# Patient Record
Sex: FEMALE | Race: WHITE | NOT HISPANIC OR LATINO | Employment: UNEMPLOYED | URBAN - METROPOLITAN AREA
[De-identification: names, ages, dates, MRNs, and addresses within clinical notes are randomized per-mention and may not be internally consistent; named-entity substitution may affect disease eponyms.]

---

## 2017-04-07 DIAGNOSIS — Z12.31 OTHER SCREENING MAMMOGRAM: ICD-10-CM

## 2019-09-14 ENCOUNTER — HOSPITAL ENCOUNTER (EMERGENCY)
Facility: HOSPITAL | Age: 59
Discharge: HOME | End: 2019-09-14
Attending: FAMILY MEDICINE

## 2019-09-14 VITALS
HEIGHT: 64 IN | DIASTOLIC BLOOD PRESSURE: 78 MMHG | RESPIRATION RATE: 18 BRPM | TEMPERATURE: 98 F | HEART RATE: 78 BPM | WEIGHT: 128 LBS | OXYGEN SATURATION: 100 % | SYSTOLIC BLOOD PRESSURE: 148 MMHG | BODY MASS INDEX: 21.85 KG/M2
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
## 2019-09-14 DIAGNOSIS — M54.50 LUMBAR PAIN: ICD-10-CM

## 2019-09-14 DIAGNOSIS — S09.90XA INJURY OF HEAD, INITIAL ENCOUNTER: ICD-10-CM

## 2019-09-14 DIAGNOSIS — S00.03XA CONTUSION OF SCALP, INITIAL ENCOUNTER: ICD-10-CM

## 2019-09-14 PROCEDURE — 99284 EMERGENCY DEPT VISIT MOD MDM: CPT | Mod: 25,ER

## 2019-09-14 PROCEDURE — 25000003 PHARM REV CODE 250: Mod: ER | Performed by: FAMILY MEDICINE

## 2019-09-14 RX ORDER — METHOCARBAMOL 500 MG/1
1000 TABLET, FILM COATED ORAL 3 TIMES DAILY
Qty: 30 TABLET | Refills: 0 | Status: SHIPPED | OUTPATIENT
Start: 2019-09-14 | End: 2019-09-19

## 2019-09-14 RX ORDER — KETOROLAC TROMETHAMINE 30 MG/ML
60 INJECTION, SOLUTION INTRAMUSCULAR; INTRAVENOUS
Status: DISCONTINUED | OUTPATIENT
Start: 2019-09-14 | End: 2019-09-14

## 2019-09-14 RX ORDER — IBUPROFEN 400 MG/1
800 TABLET ORAL
Status: COMPLETED | OUTPATIENT
Start: 2019-09-14 | End: 2019-09-14

## 2019-09-14 RX ORDER — HYDROCODONE BITARTRATE AND ACETAMINOPHEN 5; 325 MG/1; MG/1
1 TABLET ORAL
Status: DISCONTINUED | OUTPATIENT
Start: 2019-09-14 | End: 2019-09-14

## 2019-09-14 RX ORDER — IBUPROFEN 800 MG/1
800 TABLET ORAL EVERY 6 HOURS PRN
Qty: 20 TABLET | Refills: 0 | Status: SHIPPED | OUTPATIENT
Start: 2019-09-14

## 2019-09-14 RX ORDER — KETOROLAC TROMETHAMINE 30 MG/ML
30 INJECTION, SOLUTION INTRAMUSCULAR; INTRAVENOUS
Status: DISCONTINUED | OUTPATIENT
Start: 2019-09-14 | End: 2019-09-14

## 2019-09-14 RX ADMIN — IBUPROFEN 800 MG: 400 TABLET, FILM COATED ORAL at 10:09

## 2019-09-14 NOTE — ED NOTES
"Pt informed of medications ordered. Pt refused Toradol, pt states "it makes my heart race, I don't like the way it makes me feel." Pt also states "I want to hold off on the other medication so I can have a clear head when the doctor comes in to discuss my results." MD notified.   "

## 2019-09-16 NOTE — ED PROVIDER NOTES
Encounter Date: 2019       History     Chief Complaint   Patient presents with    Fall     Pt reports she fell down 3 steps. Pt states she fell backwards, hit her head on a kitchen [place], hit R elbow on a door frame, and hit back on the floor. Pt with lac to posterior head, (+)hematoma Pt reports blurred vision in L dino. Pt denies LOC. (+)abrasion and bruising noted to LISANDRA steve.      59-year-old female presents with chief complaint of a fall.  Patient reports he fell down 3 steps hitting the back of her head on the floor.  Patient reports the vision left eye appears to be blurry.  Patient denies any loss of consciousness but was concerned with the symptoms so decided presents emergency room for evaluation.  Patient does admit has a history of glaucoma.  Patient reports pain at the back of her head and upper neck.  Patient also reports pain to her lower back.  Patient reports simply slipped down the steps.         Review of patient's allergies indicates:   Allergen Reactions    Adhesive     Codeine     Milk containing products     Pcn [penicillins]     Peroxide sore mouth cleanser [hydrogen peroxide]     Phenergan [promethazine]      Past Medical History:   Diagnosis Date    Anxiety     Diverticula of colon     Glaucoma      Past Surgical History:   Procedure Laterality Date    ADENOIDECTOMY      APPENDECTOMY      BREAST SURGERY      tumors removed    CHOLECYSTECTOMY      COLONOSCOPY      2013    HYSTERECTOMY      KNEE ARTHROSCOPY Right     SMALL INTESTINE SURGERY      4 lymph nodes removed    SPINE SURGERY      neck  c3-5and lower back    TONSILLECTOMY       Family History   Problem Relation Age of Onset    Diabetes Mother     Cancer Father         stomach     Social History     Tobacco Use    Smoking status: Former Smoker     Packs/day: 1.00     Last attempt to quit: 2017     Years since quittin.0    Smokeless tobacco: Never Used   Substance Use Topics    Alcohol use: No     Drug use: No     Review of Systems   Constitutional: Negative for chills and fever.   Eyes: Positive for visual disturbance.   Musculoskeletal: Positive for back pain and neck pain.   Neurological: Positive for headaches.   All other systems reviewed and are negative.      Physical Exam     Initial Vitals [09/14/19 0845]   BP Pulse Resp Temp SpO2   (!) 154/81 75 18 97.8 °F (36.6 °C) 99 %      MAP       --         Physical Exam    Nursing note and vitals reviewed.  Constitutional: She appears well-developed and well-nourished.   HENT:   Head: Normocephalic and atraumatic.   Eyes: EOM are normal. Pupils are equal, round, and reactive to light.   Neck: Neck supple. Spinous process tenderness and muscular tenderness present. Decreased range of motion present. No tracheal deviation present. No neck rigidity.       Cardiovascular: Normal rate, regular rhythm and normal heart sounds.   Musculoskeletal:        Right shoulder: She exhibits tenderness. She exhibits no bony tenderness.        Lumbar back: She exhibits tenderness, bony tenderness and pain. She exhibits no spasm.         ED Course   Procedures  Labs Reviewed - No data to display       Imaging Results          X-Ray Lumbar Spine Ap And Lateral (Final result)  Result time 09/14/19 10:01:00    Final result by Porfirio Salvador MD (09/14/19 10:01:00)                 Impression:      Nonspecific T12 wedge deformity.  Otherwise degenerative disc disease/facet arthritis of the lumbar spine.      Electronically signed by: Porfirio Salvador MD  Date:    09/14/2019  Time:    10:01             Narrative:    EXAMINATION:  XR LUMBAR SPINE AP AND LATERAL    CLINICAL HISTORY:  Back pain.,T/L-spine trauma, minor-mod, low back pain;    TECHNIQUE:  Standard lumbar spine x-ray.    COMPARISON:  None    FINDINGS:  Three-view lumbar spine x-ray.  Right upper quadrant surgical clips.  Minor scoliosis.  Nonspecific wedge deformity of T12.  Age indeterminate.  Mild lumbar  spondylosis.  Moderate multilevel facet arthritis.    No significant incidental abnormality.                               CT Cervical Spine Without Contrast (Final result)  Result time 09/14/19 10:22:16    Final result by Porfirio Salvador MD (09/14/19 10:22:16)                 Impression:      No acute findings.  Cervical degenerative disc disease.  Postop changes C4-5 and C5-6 disc levels.    All CT scans at this facility use dose modulation, iterative reconstruction and/or weight based dosing when appropriate to reduce radiation dose to as low as reasonably achievable.      Electronically signed by: Porfirio Salvador MD  Date:    09/14/2019  Time:    10:22             Narrative:    EXAMINATION:  CT CERVICAL SPINE WITHOUT CONTRAST    CLINICAL HISTORY:  Neck injury with neck pain.  History of trauma.  C-spine trauma, NEXUS/CCR positive, +risk factor(s);    TECHNIQUE:  Axial CT of the cervical spine with coronal and sagittal reformates.    All CT scans at this facility are performed  using dose modulation techniques as appropriate to performed exam including the following:  automated exposure control; adjustment of mA and/or kV according to the patients size (this includes techniques or standardized protocols for targeted exams where dose is matched to indication/reason for exam: i.e. extremities or head);  iterative reconstruction technique.    COMPARISON:  None    FINDINGS:  Limited exam in part due to difficulty in positioning the patient.    There are postoperative changes consistent with cervical discectomy and fusion at the C4-5 and C5-6 disc spaces.    C2-3, C3-4 and C6-7 cervical degenerative disc disease are noted.    No evidence of acute cervical spine fracture.  Mild multilevel facet arthritis.    No additional findings.                               CT Head Without Contrast (Final result)  Result time 09/14/19 09:48:09    Final result by Joey Hayes MD (09/14/19 09:48:09)                  Impression:      Subcutaneous hematoma/contusion overlying the left parietal calvarium without evidence of underlying fracture.    No evidence of acute intracranial hemorrhage.    All CT scans at this facility use dose modulation, iterative reconstruction, and/or weight base dosing when appropriate to reduce radiation dose to as low as reasonably achievable.      Electronically signed by: Joey Hayes  Date:    09/14/2019  Time:    09:48             Narrative:    EXAMINATION:  CT HEAD WITHOUT CONTRAST    CLINICAL HISTORY:  Head trauma, intracranial venous inj suspected;Focal neuro deficit, new, fixed or worsening, <6 hours;    TECHNIQUE:  Contiguous axial images were obtained from the skull base through the vertex without intravenous contrast.    COMPARISON:  None    FINDINGS:  No intracranial hemorrhage. No mass effect or midline shift. Normal parenchymal attenuation. The ventricles and sulci are normal in size and configuration. The paranasal sinuses and mastoid air cells are clear.  Prominent subcutaneous hematoma overlying the left frontal calvarium without evidence of underlying fracture.  Remainder of the osseous structures appear intact.                                 Medical Decision Making:   Differential Diagnosis:   Intracranial bleed, skull fracture, cervical fracture, lumbar fracture  Clinical Tests:   Radiological Study: Ordered and Reviewed  ED Management:  In light of the mechanics of the patient's fall and H visual changes came concerned about intracranial injury so CT the head and cervical spine were obtained.  Elbow revealed no acute findings but did find lower back spinous process pain and tenderness which was also x-rayed.  Patient's symptoms of right eye blurry vision abated in no acute visual disturbances were noted. Patient requested non narcotic therapy with ibuprofen.  Patient was advised to follow up PCP in 2-3 days and return here worsened.                      Clinical Impression:        ICD-10-CM ICD-9-CM   1. Fall, initial encounter W19.XXXA E888.9   2. Contusion of scalp, initial encounter S00.03XA 920   3. Lumbar pain M54.5 724.2   4. Injury of head, initial encounter S09.90XA 959.01                                Dorian Tovar MD  09/16/19 0553       Dorian Tovar MD  09/24/19 0637

## 2023-09-02 ENCOUNTER — HOSPITAL ENCOUNTER (EMERGENCY)
Age: 63
End: 2023-09-02
Payer: SELF-PAY

## 2023-09-02 VITALS
DIASTOLIC BLOOD PRESSURE: 76 MMHG | SYSTOLIC BLOOD PRESSURE: 118 MMHG | RESPIRATION RATE: 19 BRPM | HEART RATE: 84 BPM | OXYGEN SATURATION: 97 % | TEMPERATURE: 98.06 F

## 2023-09-02 VITALS
SYSTOLIC BLOOD PRESSURE: 112 MMHG | DIASTOLIC BLOOD PRESSURE: 74 MMHG | RESPIRATION RATE: 18 BRPM | HEART RATE: 77 BPM | OXYGEN SATURATION: 95 %

## 2023-09-02 VITALS — RESPIRATION RATE: 18 BRPM

## 2023-09-02 VITALS — SYSTOLIC BLOOD PRESSURE: 124 MMHG | HEART RATE: 73 BPM | DIASTOLIC BLOOD PRESSURE: 66 MMHG | OXYGEN SATURATION: 100 %

## 2023-09-02 VITALS — OXYGEN SATURATION: 99 %

## 2023-09-02 VITALS — BODY MASS INDEX: 26.6 KG/M2

## 2023-09-02 DIAGNOSIS — S82.872A: ICD-10-CM
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
## 2023-09-02 DIAGNOSIS — S82.62XA: ICD-10-CM

## 2023-09-02 DIAGNOSIS — S82.252A: Primary | ICD-10-CM

## 2023-09-02 PROCEDURE — 73590 X-RAY EXAM OF LOWER LEG: CPT

## 2023-09-02 PROCEDURE — 73610 X-RAY EXAM OF ANKLE: CPT

## 2023-09-02 PROCEDURE — 73560 X-RAY EXAM OF KNEE 1 OR 2: CPT

## 2023-09-02 PROCEDURE — 73700 CT LOWER EXTREMITY W/O DYE: CPT

## 2023-09-02 PROCEDURE — 29505 APPLICATION LONG LEG SPLINT: CPT

## 2023-09-02 PROCEDURE — 73630 X-RAY EXAM OF FOOT: CPT

## 2023-09-02 PROCEDURE — 99284 EMERGENCY DEPT VISIT MOD MDM: CPT

## 2023-09-02 NOTE — XRR_ITS
PROCEDURE INFORMATION:  
Exam: XR Left Foot  
Exam date and time: 9/2/2023 5:41 PM  
Age: 63 years old  
Clinical indication: Injury or trauma; Fall  
 
TECHNIQUE:  
Imaging protocol: Radiologic exam of the left foot.  
Views: 3 or more views.  
 
COMPARISON:  
CR (LOW EXM, ) 9/2/2023 5:35 PM  
 
FINDINGS:  
Bones/joints: Markedly comminuted fracture of the distal tibia extending  
into the tibiotalar joint is again identified. Transverse fracture of the  
distal fibula and nondisplaced posterior malleolar fracture. Lisfranc joint  
alignment is intact. No additional fracture in the midfoot or forefoot.  
Soft tissues: There is abundant soft tissue edema surrounding the ankle  
fractures.  
 
ACCESSION #: [id number] XR/XR foot LT min 3V* [id number]  
IMPRESSION:   
1.   Markedly comminuted fracture of the distal tibia extending into the   
tibiotalar joint is again identified. Transverse fracture of the distal   
fibula and nondisplaced posterior malleolar fracture.   
2.   No additional fracture in the midfoot or forefoot.

## 2023-09-02 NOTE — ED_ITS
HPI - Extremity Problem    
    
    
General:   Chief complaint: Extremity Injury, Lower       
Stated complaint: LT ankle/Knee injury       
Time Seen by Provider: 09/02/23 16:49      
     
History of Present Illness:       
Shanon Valderrama is a 63-year-old female that presents to the emergency department   
with complaints of left ankle and knee pain.  Patient reports that she stepped   
into a hole and felt/heard a loud pop.    
She had immediate pain in the ankle and knee and was unable to get up   
unassisted.  Patient was unable to ambulate.    
She is usually ambulatory at baseline without assistive devices.    
Patient has swelling noted to the lateral aspect of the ankle with ecchymosis   
present    
There are no open wounds    
Neurovascularly intact    
     
Associated symptoms: Deny chest pain, fever(s) or rash      
    
    
    
    
Review of Systems    
    
    
General:   Reports: 10 or more systems reviewed and unremarkable except in HPI   
and below      
     
Const:   Denies: fever(s), chills, change in appetite, change in weight, fatigue  
or malaise      
     
Eyes:   Denies: change in vision, eye discomfort, eye discharge or eye redness      
     
ENMT:   Denies: throat pain, enlarged tonsils, odynophagia, hoarseness, ear or   
mastoid pain, ear discharge, change in hearing, tinnitus, nasal discharge, nasal  
congestion, post nasal drip or sinus pain      
     
Card:   Denies: chest pain, palpitations, irregular heart rhythm, edema, dyspnea  
on exertion, orthopnea or leg pain with exertion      
     
Resp:   Denies: dyspnea, productive cough, non-productive cough, wheezing, stri  
lalo or chest congestion      
     
GI:   Denies: abdominal pain, nausea, vomiting, dysphagia, diarrhea,   
constipation, bloating, GI cramping or hematochezia      
     
:   Denies: flank pain, difficulty voiding, dysuria, urinary frequency,   
urinary urgency, urinary hesitancy, oliguria or hematuria      
     
Musc:   Denies: neck pain, back pain, extremity pain, joint pain, joint   
swelling, joint redness, joint warmth or muscle weakness      
     
Skin/Breast:   Denies: rash, pruritus, erythema, photosensitivity or new lesions  
     
     
Neuro:   Denies: headache(s), numbness in extremities, weakness in extremities,   
sensory changes, lack of coordination, difficulty walking, frequent falls,   
dizziness, confusion, Slurred speech present, difficulty communicating thoughts,  
seizure-like activity or involuntary movements      
     
Endo:   Denies: polyuria, polydipsia or tired all the time      
     
Hema/Lymph:   Denies: easy bruising or easy bleeding      
    
    
    
    
    
    
Physical Exam    
    
    
Const:   COMMON NORMALS: no acute distress, patient oriented x3 and alert      
GENERAL APPEARANCE: cooperative    ORIENTATION/CONSCIOUSNESS: Yes awake, Yes   
oriented to person, Yes oriented to place and Yes oriented to time      
     
HENMT:   COMMON NORMALS: normocephalic and atraumatic    HEAD & SCALP:   
normocephalic and atraumatic    FACE & SINUS: normal facial exam    MOUTH:   
Normal oral and palatal mucosa present    THROAT: posterior oropharynx normal      
     
Eye:   COMMON NORMALS: Equal, round and reactive pupils present, EOMs intact   
bilaterally, conjunctivae normal and no scleral icterus    GENERAL EYE:   
appearance normal, both eyes and all related structures    ALIGNMENT: Yes   
alignment normal    PERIORBITAL: periorbital findings normal    CONJUNCTIVA: Yes  
conjunctivae normal    PUPIL: Yes Equal, round and reactive pupils present      
     
Neck/C-Spine:   COMMON NORMALS: full ROM    GENERAL: Yes normal visual   
inspection      
     
Lymph:   LYMPHATIC: no lymphadenopathy noted      
     
Chest:   COMMONS NORMALS: normal inspection of the chest    Breast/axilla   
inspection: Yes no chest deformity, asymmetry, normal contours, no nodules,   
masses, tenderness      
     
Resp:   COMMON NORMALS: normal respiratory effort, No retractions, No use of   
accessory muscles and clear to auscultation bilaterally    EFFORT & INSPECTION:   
Yes able to speak in complete sentences and Yes symmetric chest movement      
AUSCULTATION: clear to auscultation bilaterally      
     
Cardio:   COMMON NORMALS: regular rate, regular rhythm and Peripheral pulses 2+   
throughout    RATE: regular rate    RHYTHM: regular rhythm    PERIPHERAL PULSES:  
Peripheral pulses 2+ throughout      
     
GI:   COMMON NORMALS: Normal to inspection, nondistended, normoactive bowel   
sounds present, Soft to palpation, non-tender and No hepatosplenomegaly present   
  INSPECTION: Yes normal to inspection    AUSCULTATION: Yes normoactive bowel   
sounds    PALPATION: Yes Soft to palpation and Yes No hepatosplenomegaly present  
   RECTAL EXAM: deferred      
     
Extremity:   COMMON NORMALS: normal to inspection    NARRATIVE EXTREMITY EXAM:     
    
Left lower extremity:    
Skin is clean dry and intact    
ecchymosis noted to the lateral aspect of the left ankle.  No tenting of skin.    
No lacerations.    
Patient is tender palpation over the knee and ankle.    
Patient is able to perform a straight leg raise    
Patient is able to flex and extend the knee to some degree although it causes   
considerable pain    
Patient is able to dorsiflex plantarflex the foot to some degree but this causes  
a lot of pain    
Patient is able to dorsiflex great toes    
Sensation is intact to light touch at medial, lateral, dorsal, plantar surface   
of the foot and first webspace    
DP pulses palpable and cap refills less than 3 seconds    
  GENERAL: Yes normal exam except as noted      
     
Neuro:   COMMON NORMALS: patient oriented x3    SENSORIUM/ORIENTATION: Yes   
alert, Yes oriented to person, Yes oriented to place and Yes oriented to time     
CRANIAL NERVES: Yes CN normal except as noted      
     
Psych:   COMMON NORMALS: mental status grossly normal, Normal thought process   
present, cooperative, activity/motor behavior normal, denies homicidal ideation   
and denies suicidal ideation    THOUGHT PROCESS: Normal thought process present   
    
     
Skin:   COMMON NORMALS: no rashes or lesions noted, no wounds and turgor normal   
  GENERAL SKIN EXAM: no rashes or lesions noted and turgor normal      
    
    
    
    
Procedures    
Orthopedic Splinting/Casting    
Injury #1:     
      Lower Extremity Injury Location: knee and ankle    
      Additional Comments:     
Patient has been diagnosed with a lateral tibial plateau fracture and a severely  
comminuted pilon fracture.  She is placed in a posterior long-leg splint and   
will be transferred by EMS to [facility] where she will see Ortho trauma  
physician.    
    
Course    
    
    
Vital Signs:   Vital signs:     
    
                                   Vital Signs    
    
Temperature          98.1 F              09/02/23 16:44    
Pulse Rate           73                  09/02/23 18:57    
Respiratory Rate     18                  09/02/23 18:47    
Blood Pressure       124/66              09/02/23 18:57    
Pulse Oximetry       100                 09/02/23 18:57    
Oxygen Delivery Me    
thod                 Room Air            09/02/23 18:57    
    
    
    
    
    
    
    
    
MDM - Extremity (Nontraumatic)    
Medical Decision Making    
Was evaluated in the emergency department for orthopedic injury/pain.    
Differential diagnoses include fracture, fracture dislocation, trimal/kwaku,   
none, tibial plateau, we will shaft fracture, Maisonneuve fracture.    
Contusion, abrasions, effusions    
    
Underwent XR imaging of the left lower extremity.  Imaging occluded XR foot,   
ankle, tibia, knee.    
Imaging reveals comminuted fracture of the distal tibia; appears as though it   
could be a pilon fracture.    
She also has a tibial plateau fracture.    
CT of the lower extremity was obtained.    
I did speak with Dr. Staples about imaging.  Recommends transfer to Ortho trauma   
provider.    
Did speak with patient about which direction she would like to go.  She is   
ultimately decided [facility] in [place].    
    
I have discussed imaging with Ortho trauma on-call and they have reviewed the   
imaging.  They would be happy to see the patient if she was transferred.  We are  
reaching out to transfer center to arrange transfer.    
Patient updated .    
    
Patient has been excepted by [facility] physician Dr. Areli Ramos with Ortho   
to see.     
Patient has agreed to transport by EMS with her  following by private   
vehicle    
Posterior long-leg splint applied    
    
Lab Data    
    
Radiology Impressions    
    
Lower Extremity CT  09/02/23 17:59    
IMPRESSION:     
1.   There is a comminuted die punch fracture of the lateral tibial plateau     
with maximal depression of the articular surface measuring 5 mm. This     
fracture exits through the tibial spines and lateral tibial metaphysis.     
2.   Coronal images also demonstrate marked subchondral density paralleling     
the articular surface of the medial tibia compatible with microtrabecular     
impaction fracture without violation of the articular cortex or significant     
depression of the medial articular surface.     
3.   There is and intra-articular markedly comminuted and impacted/die punch     
fracture of the distal tibia with marked destruction of the majority of the     
of the articular surface. Maximal depression of the articular surfaces at     
the ankle joint is 1 cm and there is a large divot of the central and medial     
tibial articular surface measuring 2.4 x 2.5 cm.     
4.   Comminuted fracture distal fibula. No fracture of the proximal fibula.     
     
    
    
    
    
    
    
    
    
    
Discharge Plan    
Discharge    
Patient Disposition: Transfer to ED    
    
Clinical Impression:    
 Closed fracture of tibial plateau, Closed fracture of lateral malleolus, Closed  
pilon fracture    
    
    
Condition: Stable    
    
Referrals:    
Gracia Campbell PA [Primary Care Provider] -     
    
    
Coding    
Level of Care Code    
ED  for Chg Fwd

## 2023-09-02 NOTE — CTR_ITS
PROCEDURE INFORMATION:  
Exam: CT Left Lower Extremity Without Contrast  
Exam date and time: 9/2/2023 6:19 PM  
Age: 63 years old  
Clinical indication: Injury or trauma; Fall; Blunt trauma; Left; Injury  
details: Patient tripped and fell on ground today. Fractures to both knee  
and ankle. ; Additional info: Tibial plateau fracture, bimalleolar versus  
pilon fracture  
 
TECHNIQUE:  
Imaging protocol: CT of the left lower extremity without contrast was  
performed.  
Radiation optimization: All CT scans at this facility use at least one of  
these dose optimization techniques: automated exposure control; mA and/or kV  
adjustment per patient size (includes targeted exams where dose is matched  
to clinical indication); or iterative reconstruction.  
 
REPORTING DATA:  
Count of CT and Cardiac NM exams in prior 12 months: This patient has  
received 0 known CTs and 0 known cardiac nuclear medicine studies in the 12  
months prior to the current study.  
 
COMPARISON:  
CR (LOW EXM, ) 9/2/2023 5:41 PM  
 
RADIATION DOSE METRICS:  
Total DLP (mGy-cm): 880.41  
 
FINDINGS:  
Bones/joints: There is a lipohemarthrosis of the knee joint. There is a  
comminuted dive punch fracture of the lateral tibial plateau with maximal  
depression of the articular surface measuring 5 mm. Fracture of the lateral  
tibial plateau exits the metaphysis of the tibia and extends into the knee  
joint through both intercondylar spines. Coronal images also demonstrate  
marked subchondral density paralleling the articular surface of the medial  
tibia compatible with microtrabecular impaction fracture without violation  
of the articular cortex or significant depression of the articular surface.  
There is and intra-articular markedly comminuted and impacted fracture of  
the distal tibia with marked destruction of the majority of the of the  
articular surface. Maximal depression of the articular surfaces at the ankle  
joint is 1 cm and there is a large divot of the central and medial tibial  
articular surface measuring 2.4 x 2.5 cm. There is medial angulation and  
comminution of the medial malleolus. There is a portion of the anterolateral  
articular surface of the tibia that is intact measuring 3.1 x 2.7 cm.  
Posterior malleolar fracture is noted with less than 2 mm depression of the  
articular surface. There is a comminuted fracture of the distal fibula  
metaphysis. No acute fracture of the visualized talus or calcaneus. No  
fracture of the proximal or midshaft fibula. No acute fracture of the femur  
or patella. There is lateral tilting and subluxation of the patella.  
Soft tissues: There is abundant soft tissue edema adjacent to the multiple  
fractures.  
 
Other findings: Segond type fracture fragment is noted lateral to the tibia.  
 
ACCESSION #: [id number] CT/CT lower leg LT wo con* [id number]  
IMPRESSION:   
1.   There is a comminuted die punch fracture of the lateral tibial plateau   
with maximal depression of the articular surface measuring 5 mm. This   
fracture exits through the tibial spines and lateral tibial metaphysis.   
2.   Coronal images also demonstrate marked subchondral density paralleling   
the articular surface of the medial tibia compatible with microtrabecular   
impaction fracture without violation of the articular cortex or significant   
depression of the medial articular surface.   
3.   There is and intra-articular markedly comminuted and impacted/die punch   
fracture of the distal tibia with marked destruction of the majority of the   
of the articular surface. Maximal depression of the articular surfaces at   
the ankle joint is 1 cm and there is a large divot of the central and medial   
tibial articular surface measuring 2.4 x 2.5 cm.   
4.   Comminuted fracture distal fibula. No fracture of the proximal fibula.

## 2023-09-02 NOTE — W.ED.EXTPRO
HPI - Extremity Problem
General:   Chief complaint: Extremity Injury, Lower 
Stated complaint: LT ankle/Knee injury 
Time Seen by Provider: 09/02/23 16:49
History of Present Illness:   
Shanon Valderrama is a 63-year-old female that presents to the emergency department with complaints of left ankle and knee pain.  Patient reports that she stepped into a hole and felt/heard a loud pop.
She had immediate pain in the ankle and knee and was unable to get up unassisted.  Patient was unable to ambulate.
She is usually ambulatory at baseline without assistive devices.
Patient has swelling noted to the lateral aspect of the ankle with ecchymosis present
There are no open wounds
Neurovascularly intact
 
Associated symptoms: Deny chest pain, fever(s) or rash



Review of Systems
General:   Reports: 10 or more systems reviewed and unremarkable except in HPI and below
Const:   Denies: fever(s), chills, change in appetite, change in weight, fatigue or malaise
Eyes:   Denies: change in vision, eye discomfort, eye discharge or eye redness
ENMT:   Denies: throat pain, enlarged tonsils, odynophagia, hoarseness, ear or mastoid pain, ear discharge, change in hearing, tinnitus, nasal discharge, nasal congestion, post nasal drip or sinus pain
Card:   Denies: chest pain, palpitations, irregular heart rhythm, edema, dyspnea on exertion, orthopnea or leg pain with exertion
Resp:   Denies: dyspnea, productive cough, non-productive cough, wheezing, stridor or chest congestion
GI:   Denies: abdominal pain, nausea, vomiting, dysphagia, diarrhea, constipation, bloating, GI cramping or hematochezia
:   Denies: flank pain, difficulty voiding, dysuria, urinary frequency, urinary urgency, urinary hesitancy, oliguria or hematuria
Musc:   Denies: neck pain, back pain, extremity pain, joint pain, joint swelling, joint redness, joint warmth or muscle weakness
Skin/Breast:   Denies: rash, pruritus, erythema, photosensitivity or new lesions
Neuro:   Denies: headache(s), numbness in extremities, weakness in extremities, sensory changes, lack of coordination, difficulty walking, frequent falls, dizziness, confusion, Slurred speech present, difficulty communicating thoughts, seizure-like 
activity or involuntary movements
Endo:   Denies: polyuria, polydipsia or tired all the time
Hema/Lymph:   Denies: easy bruising or easy bleeding





Physical Exam
Const:   COMMON NORMALS: no acute distress, patient oriented x3 and alert  GENERAL APPEARANCE: cooperative  ORIENTATION/CONSCIOUSNESS: Yes awake, Yes oriented to person, Yes oriented to place and Yes oriented to time
HENMT:   COMMON NORMALS: normocephalic and atraumatic  HEAD & SCALP: normocephalic and atraumatic  FACE & SINUS: normal facial exam  MOUTH: Normal oral and palatal mucosa present  THROAT: posterior oropharynx normal
Eye:   COMMON NORMALS: Equal, round and reactive pupils present, EOMs intact bilaterally, conjunctivae normal and no scleral icterus  GENERAL EYE: appearance normal, both eyes and all related structures  ALIGNMENT: Yes alignment normal  PERIORBITAL: 
periorbital findings normal  CONJUNCTIVA: Yes conjunctivae normal  PUPIL: Yes Equal, round and reactive pupils present
Neck/C-Spine:   COMMON NORMALS: full ROM  GENERAL: Yes normal visual inspection
Lymph:   LYMPHATIC: no lymphadenopathy noted
Chest:   COMMONS NORMALS: normal inspection of the chest  Breast/axilla inspection: Yes no chest deformity, asymmetry, normal contours, no nodules, masses, tenderness
Resp:   COMMON NORMALS: normal respiratory effort, No retractions, No use of accessory muscles and clear to auscultation bilaterally  EFFORT & INSPECTION: Yes able to speak in complete sentences and Yes symmetric chest movement  AUSCULTATION: clear 
to auscultation bilaterally
Cardio:   COMMON NORMALS: regular rate, regular rhythm and Peripheral pulses 2+ throughout  RATE: regular rate  RHYTHM: regular rhythm  PERIPHERAL PULSES: Peripheral pulses 2+ throughout
GI:   COMMON NORMALS: Normal to inspection, nondistended, normoactive bowel sounds present, Soft to palpation, non-tender and No hepatosplenomegaly present  INSPECTION: Yes normal to inspection  AUSCULTATION: Yes normoactive bowel sounds  PALPATION: 
Yes Soft to palpation and Yes No hepatosplenomegaly present  RECTAL EXAM: deferred
Extremity:   COMMON NORMALS: normal to inspection  NARRATIVE EXTREMITY EXAM: 

Left lower extremity:
Skin is clean dry and intact
ecchymosis noted to the lateral aspect of the left ankle.  No tenting of skin.  No lacerations.
Patient is tender palpation over the knee and ankle.
Patient is able to perform a straight leg raise
Patient is able to flex and extend the knee to some degree although it causes considerable pain
Patient is able to dorsiflex plantarflex the foot to some degree but this causes a lot of pain
Patient is able to dorsiflex great toes
Sensation is intact to light touch at medial, lateral, dorsal, plantar surface of the foot and first webspace
DP pulses palpable and cap refills less than 3 seconds
  GENERAL: Yes normal exam except as noted
Neuro:   COMMON NORMALS: patient oriented x3  SENSORIUM/ORIENTATION: Yes alert, Yes oriented to person, Yes oriented to place and Yes oriented to time  CRANIAL NERVES: Yes CN normal except as noted
Psych:   COMMON NORMALS: mental status grossly normal, Normal thought process present, cooperative, activity/motor behavior normal, denies homicidal ideation and denies suicidal ideation  THOUGHT PROCESS: Normal thought process present
Skin:   COMMON NORMALS: no rashes or lesions noted, no wounds and turgor normal  GENERAL SKIN EXAM: no rashes or lesions noted and turgor normal



Procedures
Orthopedic Splinting/Casting
Injury #1: 
      Lower Extremity Injury Location: knee and ankle
      Additional Comments: 
Patient has been diagnosed with a lateral tibial plateau fracture and a severely comminuted pilon fracture.  She is placed in a posterior long-leg splint and will be transferred by EMS to [facility] where she will see Ortho trauma physician.

Course
Vital Signs:   Vital signs: 

Vital Signs

Temperature          98.1 F              09/02/23 16:44
Pulse Rate           73                  09/02/23 18:57
Respiratory Rate     18                  09/02/23 18:47
Blood Pressure       124/66              09/02/23 18:57
Pulse Oximetry       100                 09/02/23 18:57
Oxygen Delivery Me
thod                 Room Air            09/02/23 18:57







MDM - Extremity (Nontraumatic)
Medical Decision Making
Was evaluated in the emergency department for orthopedic injury/pain.  Differential diagnoses include fracture, fracture dislocation, trimal/kwaku, none, tibial plateau, we will shaft fracture, Maisonneuve fracture.
Contusion, abrasions, effusions

Underwent XR imaging of the left lower extremity.  Imaging occluded XR foot, ankle, tibia, knee.
Imaging reveals comminuted fracture of the distal tibia; appears as though it could be a pilon fracture.
She also has a tibial plateau fracture.
CT of the lower extremity was obtained.
I did speak with Dr. Staples about imaging.  Recommends transfer to Ortho trauma provider.
Did speak with patient about which direction she would like to go.  She is ultimately decided [facility] in [place].

I have discussed imaging with Ortho trauma on-call and they have reviewed the imaging.  They would be happy to see the patient if she was transferred.  We are reaching out to transfer center to arrange transfer.
Patient updated .

Patient has been excepted by [facility] physician Dr. Areli Ramos with Ortho to see. 
Patient has agreed to transport by EMS with her  following by private vehicle
Posterior long-leg splint applied

Lab Data

Radiology Impressions

Lower Extremity CT  09/02/23 17:59
IMPRESSION: 
1.   There is a comminuted die punch fracture of the lateral tibial plateau 
with maximal depression of the articular surface measuring 5 mm. This 
fracture exits through the tibial spines and lateral tibial metaphysis. 
2.   Coronal images also demonstrate marked subchondral density paralleling 
the articular surface of the medial tibia compatible with microtrabecular 
impaction fracture without violation of the articular cortex or significant 
depression of the medial articular surface. 
3.   There is and intra-articular markedly comminuted and impacted/die punch 
fracture of the distal tibia with marked destruction of the majority of the 
of the articular surface. Maximal depression of the articular surfaces at 
the ankle joint is 1 cm and there is a large divot of the central and medial 
tibial articular surface measuring 2.4 x 2.5 cm. 
4.   Comminuted fracture distal fibula. No fracture of the proximal fibula. 
 









Discharge Plan
Discharge
Patient Disposition: Transfer to ED

Clinical Impression:
 Closed fracture of tibial plateau, Closed fracture of lateral malleolus, Closed pilon fracture


Condition: Stable

Referrals:
Gracia Campbell PA [Primary Care Provider] - 


Coding
Level of Care Code
ED  for Chg Fwd

## 2023-09-02 NOTE — XRR_ITS
PROCEDURE INFORMATION:  
Exam: XR Left Ankle  
Exam date and time: 9/2/2023 5:35 PM  
Age: 63 years old  
Clinical indication: Injury or trauma; Fall  
 
TECHNIQUE:  
Imaging protocol: Radiologic exam of the left ankle.  
Views: 3 or more views.  
 
COMPARISON:  
CR (LOW EXM, ) 9/2/2023 5:30 PM  
 
FINDINGS:  
Bones/joints: There is a markedly comminuted and impacted fracture of the  
distal central and medial tibia plafond with marked deformity of the  
articular surface of the tibiotalar joint. Multiple fracture lines are also  
identified in the distal tibial metadiaphysis. There is a transverse  
fracture of the distal fibula and nondisplaced fracture of the posterior  
malleolus. No dislocation. No osteochondral lesion of the talus.  
Soft tissues: There is abundant soft tissue edema.  
 
ACCESSION #: [id number] XR/XR ankle LT min 3V* [id number]  
IMPRESSION:   
1.   There is a markedly comminuted and impacted fracture of the distal   
central and medial tibia plafond with marked deformity of the articular   
surface of the tibiotalar joint.   
2.   There is a transverse fracture of the distal fibula and nondisplaced   
fracture of the posterior malleolus.

## 2023-09-02 NOTE — XRR_ITS
PROCEDURE INFORMATION:  
Exam: XR Left Tibia and Fibula  
Exam date and time: 9/2/2023 5:30 PM  
Age: 63 years old  
Clinical indication: Injury or trauma; Fall  
 
TECHNIQUE:  
Imaging protocol: Radiologic exam of the left tibia and fibula.  
Views: 2 views.  
 
COMPARISON:  
CR (LOW EXM, ) 9/2/2023 5:20 PM  
 
FINDINGS:  
Bones/joints: There is a markedly comminuted fracture of the distal tibial  
metadiaphysis with marked comminution of the articular surface of the  
tibiotalar joint. There is also a nondisplaced fracture of the posterior  
malleolus and transverse fracture of the distal fibula. Comminuted and  
impacted die punch fracture of the lateral tibial plateau at the knee joint  
is also visualized. No additional fracture of the proximal or midshaft  
fibula. No dislocation.  
Soft tissues: There is soft tissue edema adjacent to both the proximal and  
distal tibia fractures.  
 
ACCESSION #: [id number] XR/XR tibia fibula LT 2V [id number]  
IMPRESSION:   
1.   There is a markedly comminuted fracture of the distal tibial   
metadiaphysis with marked comminution of the articular surface of the   
tibiotalar joint. There is also a nondisplaced fracture of the posterior   
malleolus and transverse fracture of the distal fibula.   
2.   Comminuted and impacted die punch fracture of the lateral tibial   
plateau at the knee joint is also visualized.  Please see the CT extremity   
report of the same day.

## 2023-11-01 ENCOUNTER — HOSPITAL ENCOUNTER (OUTPATIENT)
Dept: HOSPITAL 98 - RAD | Age: 63
Discharge: HOME | End: 2023-11-01
Payer: SELF-PAY

## 2023-11-01 DIAGNOSIS — Z13.820: Primary | ICD-10-CM

## 2023-11-01 DIAGNOSIS — M85.89: ICD-10-CM

## 2023-11-01 DIAGNOSIS — Z78.0: ICD-10-CM

## 2023-11-01 PROCEDURE — 77080 DXA BONE DENSITY AXIAL: CPT

## 2023-11-01 NOTE — XR_ITS
WS: [id number] 
 
SCREENING DEXA SCAN [employer] 
 
CLINICAL INFORMATION: Postmenopausal 
 
COMPARISON: None. 
 
FINDINGS:  
 
The L1-L4 bone mineral density measures 1.029 g/cm2. This corresponds to a T score score of -1.3 and 
Z score of 0.1. 
 
Left femoral neck bone mineral density measures 0.712 g/cm2. This corresponds to a T score of -2.3 an
d Z score of -1.3. 
Right femoral neck bone mineral density measures 0.692 g/cm2. This corresponds to a T score -2.5of an
d Z score of -1.5. 
 
Mean femoral neck bone mineral density measures 0.702 g/cm2. This corresponds to a T score of -2.4 an
d Z score of -1.4. 
 
 
IMPRESSION: 
 
Osteopenia lumbar spine. Osteopenia femoral necks at the upper end of the range approaching osteoporo
sis. 
Patient's FRAX calculated 10 year probability for major osteoporotic fracture is 24.3% and osteoporot
ic hip fracture is 6.5%.

## 2024-08-12 ENCOUNTER — HOSPITAL ENCOUNTER (OUTPATIENT)
Dept: HOSPITAL 98 - RAD | Age: 64
Discharge: HOME | End: 2024-08-12
Payer: MEDICARE

## 2024-08-12 DIAGNOSIS — R13.10: Primary | ICD-10-CM

## 2024-08-12 PROCEDURE — 74220 X-RAY XM ESOPHAGUS 1CNTRST: CPT

## 2024-08-12 NOTE — FL_ITS
WS: [id number] 
 
Examination: FL barium swallow [id number] 
 
Reason for Exam: DYSPHAGIA 
 
Date: August 12, 2024 
 
Comparison: None. 
 
Findings:  
 
Initial single column thin barium esophagram was performed. Contrast passed freely through the esopha
arik. No obstruction or stricture was appreciated. 
 
Next a air-contrast esophagram was performed with multiple obliquities. Barium passes freely from the
 mouth to the stomach without obstruction. There is no stricture or diverticulum. 
 
No hiatal hernia was appreciated. Reflux was not identified. 
 
ACCESSION #: [id number] FL/FL barium swallow [id number]  
Impression:   
 
  
There is no stricture or diverticulum identified. Esophagus appears grossly unr
emarkable

## 2024-10-09 ENCOUNTER — HOSPITAL ENCOUNTER (OUTPATIENT)
Dept: HOSPITAL 98 - RAD | Age: 64
Discharge: HOME | End: 2024-10-09
Payer: MEDICARE

## 2024-10-09 DIAGNOSIS — Z12.31: Primary | ICD-10-CM

## 2024-10-09 PROCEDURE — 77062 BREAST TOMOSYNTHESIS BI: CPT

## 2024-10-09 NOTE — XRR_ITS
PROCEDURE INFORMATION:  
Exam: XR Left Knee  
Exam date and time: 9/2/2023 5:20 PM  
Age: 63 years old  
Clinical indication: Injury or trauma; Fall  
 
TECHNIQUE:  
Imaging protocol: Radiologic exam of the left knee.  
Views: 1 or 2 views.  
 
COMPARISON:  
No relevant prior studies available.  
 
FINDINGS:  
Bones/joints: There is a die punch comminuted fracture of the lateral tibial  
plateau exiting the tibial metaphysis and between both tibial intercondylar  
spines. There is a lipohemarthrosis left knee joint. No visualized fracture  
of the femur patella or proximal fibula.  
Soft tissues: There is soft tissue edema adjacent to the tibia fracture.  
 
ACCESSION #: [id number] XR/XR knee LT 1-2V [id number]  
IMPRESSION:   
There is a die punch comminuted fracture of the lateral tibial plateau   
exiting the tibial metaphysis and between both tibial intercondylar spines. Staff tried reaching out to patient to get her rescheduled for her appointment that was canceled on yesterday. Staff left a voicemail and portal message.

## 2024-10-09 NOTE — MM_ITS
WS: [id number] 
 
DIAGNOSTIC BILATERAL DIGITAL BREAST TOMOSYNTHESIS MAMMOGRAPHY WITH CAD 
 
 
HISTORY: HX OF BREAST TUMOR 
 
COMPARISON: None available. 
 
TECHNIQUE: Bilateral craniocaudad, mediolateral oblique, and mediolateral views are submitted with to
mosynthesis and SM. Computer aided detection utilized. 
 
Breast composition: The breasts are extremely dense, which lowers the sensitivity of mammography. 
 
Numerous scattered asymmetries and calcifications within each breast. No distortion. Breasts are very
 dense. 
 
ACCESSION #: [id number] MM/MM diag BI tomosynthesis [id number]  
IMPRESSION:  
 
  
BI-RADS: 2 - Benign  
FOLLOW UP: 1 Year Follow-up

## 2025-05-13 ENCOUNTER — HOSPITAL ENCOUNTER (OUTPATIENT)
Age: 65
Discharge: HOME | End: 2025-05-13
Payer: MEDICARE

## 2025-05-13 DIAGNOSIS — Z90.49: ICD-10-CM

## 2025-05-13 DIAGNOSIS — Z12.2: Primary | ICD-10-CM

## 2025-05-13 DIAGNOSIS — K44.9: ICD-10-CM

## 2025-05-13 DIAGNOSIS — J84.10: ICD-10-CM

## 2025-05-13 DIAGNOSIS — R91.8: ICD-10-CM

## 2025-05-13 DIAGNOSIS — I70.0: ICD-10-CM

## 2025-05-13 DIAGNOSIS — Z87.891: ICD-10-CM

## 2025-05-13 DIAGNOSIS — J98.4: ICD-10-CM

## 2025-05-13 DIAGNOSIS — R93.3: ICD-10-CM

## 2025-05-13 DIAGNOSIS — J47.9: ICD-10-CM

## 2025-05-13 PROCEDURE — 71271 CT THORAX LUNG CANCER SCR C-: CPT

## 2025-05-22 ENCOUNTER — HOSPITAL ENCOUNTER
Age: 65
LOS: 9 days | Discharge: HOME | End: 2025-05-31
Payer: MEDICARE

## 2025-05-22 VITALS
SYSTOLIC BLOOD PRESSURE: 123 MMHG | RESPIRATION RATE: 16 BRPM | OXYGEN SATURATION: 96 % | DIASTOLIC BLOOD PRESSURE: 72 MMHG | HEART RATE: 66 BPM | TEMPERATURE: 97.7 F

## 2025-05-22 DIAGNOSIS — Z79.899: ICD-10-CM

## 2025-05-22 DIAGNOSIS — M81.0: Primary | ICD-10-CM

## 2025-05-22 PROCEDURE — 96372 THER/PROPH/DIAG INJ SC/IM: CPT
